# Patient Record
Sex: MALE | Race: ASIAN | NOT HISPANIC OR LATINO
[De-identification: names, ages, dates, MRNs, and addresses within clinical notes are randomized per-mention and may not be internally consistent; named-entity substitution may affect disease eponyms.]

---

## 2022-11-17 PROBLEM — Z00.00 ENCOUNTER FOR PREVENTIVE HEALTH EXAMINATION: Status: ACTIVE | Noted: 2022-11-17

## 2022-11-22 ENCOUNTER — APPOINTMENT (OUTPATIENT)
Dept: SURGICAL ONCOLOGY | Facility: CLINIC | Age: 77
End: 2022-11-22

## 2022-11-30 PROBLEM — E78.5 HYPERLIPIDEMIA, UNSPECIFIED: Chronic | Status: ACTIVE | Noted: 2022-08-04

## 2022-11-30 PROBLEM — I10 ESSENTIAL (PRIMARY) HYPERTENSION: Chronic | Status: ACTIVE | Noted: 2022-08-04

## 2022-12-05 ENCOUNTER — APPOINTMENT (OUTPATIENT)
Dept: SURGICAL ONCOLOGY | Facility: CLINIC | Age: 77
End: 2022-12-05

## 2022-12-05 VITALS
BODY MASS INDEX: 23.5 KG/M2 | TEMPERATURE: 98.4 F | SYSTOLIC BLOOD PRESSURE: 158 MMHG | DIASTOLIC BLOOD PRESSURE: 73 MMHG | HEIGHT: 66.54 IN | OXYGEN SATURATION: 97 % | HEART RATE: 46 BPM | WEIGHT: 148 LBS

## 2022-12-05 DIAGNOSIS — Z86.39 PERSONAL HISTORY OF OTHER ENDOCRINE, NUTRITIONAL AND METABOLIC DISEASE: ICD-10-CM

## 2022-12-05 DIAGNOSIS — K82.8 OTHER SPECIFIED DISEASES OF GALLBLADDER: ICD-10-CM

## 2022-12-05 DIAGNOSIS — Z01.812 ENCOUNTER FOR PREPROCEDURAL LABORATORY EXAMINATION: ICD-10-CM

## 2022-12-05 DIAGNOSIS — Z78.9 OTHER SPECIFIED HEALTH STATUS: ICD-10-CM

## 2022-12-05 DIAGNOSIS — K86.89 OTHER SPECIFIED DISEASES OF PANCREAS: ICD-10-CM

## 2022-12-05 DIAGNOSIS — Z86.79 PERSONAL HISTORY OF OTHER DISEASES OF THE CIRCULATORY SYSTEM: ICD-10-CM

## 2022-12-05 PROCEDURE — 99203 OFFICE O/P NEW LOW 30 MIN: CPT

## 2022-12-05 RX ORDER — ESOMEPRAZOLE MAGNESIUM 40 MG/1
40 CAPSULE, DELAYED RELEASE ORAL
Refills: 0 | Status: ACTIVE | COMMUNITY

## 2022-12-05 RX ORDER — AMLODIPINE BESYLATE 5 MG/1
5 TABLET ORAL
Refills: 0 | Status: ACTIVE | COMMUNITY

## 2022-12-05 RX ORDER — TAMSULOSIN HYDROCHLORIDE 0.4 MG/1
0.4 CAPSULE ORAL
Refills: 0 | Status: ACTIVE | COMMUNITY

## 2022-12-05 NOTE — HISTORY OF PRESENT ILLNESS
[de-identified] : Patient Name: EUGENE SHEA \par MRN: [id number] \par [place] MRN:\par Referring Provider: AMARILYS SMALL MD \par Date: 12/5/22\par \par Diagnosis: gallbladder lesion\par \par 77 year male  presents for evaluation of a 2.3 cm lesion in the gallbladder duct, dilated pancreas duct and stenosis\par In October 2022, he sought medical advice for complaints of epigastric pain and weight loss of 10lbs over 6 months. An ultrasound was done and reportedly showed a lesion in the gallbladder wall. He also complains of pruritus, dark yellow urine (which as resolved).\par 11/5/22 CA 19-9; 43.4, TBili 1.5, DBili 0.9, AST/ALT 64/129\par 11/7/22 MRI shows a 2.3 cm lesion in the fundus of the gallbladder, dilated pancreatic duct, and stenosis\par He has not yet had any further workup.\par \par Currently, Mr. SHEA denies abdominal pain and discomfort, denies having decreased appetite, and denies nausea or vomiting. He denies changes in bowel habits, and his urine has normalized in color. He denies fevers, chills, or night sweats.\par \par Functional Status: Mr. SHEA is able to walk up 2 flights of stairs without fatigue or dyspnea.\par

## 2022-12-05 NOTE — ASSESSMENT
What Type Of Note Output Would You Prefer (Optional)?: Bullet Format [FreeTextEntry1] : I) (1) GB mass (2) abnormal pancreas duct\par \par P) Discussed w patient via  the abnormal findings seen on MRI. Needs an EUS to better assess PD and CBD. Not clear to me what to make of the history the describes of itching and dark urine. No evidence for bile duct obstruction on imaging. Will see him again after EUS. WIll at least need his GB removed but may need pancreas surgery as well. All questions answered.\par \par Vernon Bailey MD\par \par Chief Surgical Oncology\par Multidisciplinary GI cancer program\par [facility] [place]\par [facility]\par \par Professor Surgery\par [employer]\par \par cc Dr Ashley Feldman, Dr Sanchez [facility] Title: Evaluation of Skin Lesions How Severe Are Your Spot(S)?: mild Have Your Spot(S) Been Treated In The Past?: has not been treated

## 2022-12-05 NOTE — RESULTS/DATA
[FreeTextEntry1] : Date: 11/7/22\par Study: MRI/MRCP Abdomen [facility] ([facility])\par Results: The post gadolinium portion of this study is severely limited by patient motion and is nondiagnostic.\par The study confirms a 2.3 cm lesion in the fundus of the gallbladder\par There is dilatation of the pancreatic duct in the pancreatic head to 0.45cm. There is irregularity and beading with an area of severe stenosis in the mid and distal portions of the pancreatic duct. Although no masses demonstrated, malignancy cannot be excluded\par There is no dilatation of the common bile duct\par There are bilateral renal subcentimeter simple cysts\par There are subcentimeter hepatic simple cysts\par Given the patient motion, further evaluation with a pre and postcontrast CT scan of the abdomen is suggested\par ERCP is also suggested\par \par Date: 9/30/22\par Study: EGD (Dr. Gustavo Owens)\par Results: Normal esophagus, congested erythematous atrophic discolored and granular mucosa in the gastric antrum, gastric body and incisura (biopsied), the examination was otherwise normal

## 2022-12-05 NOTE — PHYSICAL EXAM
[Normal Neck Lymph Nodes] : normal neck lymph nodes  [Normal Supraclavicular Lymph Nodes] : normal supraclavicular lymph nodes [Normal] : oriented to person, place and time, with appropriate affect [de-identified] : anicteric [de-identified] : S1,S2, regular rate and rhythm. No murmurs heard. [de-identified] : Clear throughout. No wheezes heard. [de-identified] : warm and dry

## 2022-12-23 ENCOUNTER — APPOINTMENT (OUTPATIENT)
Dept: GASTROENTEROLOGY | Facility: HOSPITAL | Age: 77
End: 2022-12-23

## 2023-03-01 VITALS
WEIGHT: 149.91 LBS | TEMPERATURE: 98 F | OXYGEN SATURATION: 94 % | SYSTOLIC BLOOD PRESSURE: 134 MMHG | RESPIRATION RATE: 16 BRPM | HEIGHT: 66.93 IN | HEART RATE: 45 BPM | DIASTOLIC BLOOD PRESSURE: 71 MMHG

## 2023-03-01 RX ORDER — CHLORHEXIDINE GLUCONATE 213 G/1000ML
1 SOLUTION TOPICAL ONCE
Refills: 0 | Status: DISCONTINUED | OUTPATIENT
Start: 2023-03-06 | End: 2023-03-20

## 2023-03-01 NOTE — H&P ADULT - NSHPLABSRESULTS_GEN_ALL_CORE
16.1   5.24  )-----------( 186      ( 06 Mar 2023 08:03 )             48.6       03-06    143  |  103  |  16  ----------------------------<  94  3.9   |  28  |  0.87    Ca    9.4      06 Mar 2023 08:03    TPro  7.9  /  Alb  4.8  /  TBili  0.6  /  DBili  x   /  AST  21  /  ALT  15  /  AlkPhos  74  03-06      PT/INR - ( 06 Mar 2023 08:03 )   PT: 12.3 sec;   INR: 1.03          PTT - ( 06 Mar 2023 08:03 )  PTT:33.6 sec    CARDIAC MARKERS ( 06 Mar 2023 08:03 )  x     / x     / 95 U/L / x     / 1.9 ng/mL            EKG: SB at 45 bpm. 1 mm ST elevation V1. 1 mm J point elevation V2. T all/Peaked T waves V2-V3.

## 2023-03-01 NOTE — H&P ADULT - ASSESSMENT
78 yo Mandarin speaking M with PMHx of HTN, HLD, BPH, GERD who presents for cardiac cath due to patient's risk factors, CCS Angina Class III Symptoms and abnormal NST concerning for multivessel CAD.  ASA III          Mallampati III  Patient is a candidate for sedation: yes  Sedation: Moderate    Risks & benefits of procedure and alternative therapy have been explained to the patient including but not limited to: allergic reaction, bleeding w/possible need for blood transfusion, infection, renal and vascular compromise, limb damage, arrhythmia, stroke, vessel dissection/perforation, Myocardial infarction, emergent CABG. Informed consent obtained and in chart.       Hgb/HCT normal (16.1/48.6) and Platelets normal (186,000). Patient denies bleeding, BRBPR, melena, hematuria, hematemesis. Patient currently takes Aspirin 81 mg daily and reports compliance last dose 3/5/23    EF normal by echo. Patient euvolemic on exam and has no history of CHF.  cc Bolus IV x 1 followed by NS 75 cc/hr x 2 hrs per Hydration Protocol.  76 yo Mandarin speaking M with PMHx of HTN, HLD, BPH, GERD who presents for cardiac cath due to patient's risk factors, CCS Angina Class III Symptoms and abnormal NST concerning for multivessel CAD.  ASA III          Mallampati III  Patient is a candidate for sedation: yes  Sedation: Moderate    Risks & benefits of procedure and alternative therapy have been explained to the patient including but not limited to: allergic reaction, bleeding w/possible need for blood transfusion, infection, renal and vascular compromise, limb damage, arrhythmia, stroke, vessel dissection/perforation, Myocardial infarction, emergent CABG. Informed consent obtained and in chart.       Hgb/HCT normal (16.1/48.6) and Platelets normal (186,000). Patient denies bleeding, BRBPR, melena, hematuria, hematemesis. Patient currently takes Aspirin 81 mg daily and reports compliance last dose 3/5/23. Stress test concerning for   3VCAD. Per Dr. Suarez will load Plavix on the table if needed.     EF normal by echo. Patient euvolemic on exam and has no history of CHF.  cc Bolus IV x 1 followed by NS 75 cc/hr x 2 hrs per Hydration Protocol.

## 2023-03-01 NOTE — H&P ADULT - HISTORY OF PRESENT ILLNESS
COVID:   Pharmacy:  Cardiologist:   Escort:    76 yo M with PMHx of HTN, HLD, BPH who presented to cardiologist Dr. Suarez's office with reports of  INCOMPLETE    COVID:   Pharmacy:  Cardiologist:   Escort:    76 yo M with PMHx of HTN, HLD, BPH who presented to cardiologist Dr. Suarez's office with reports of intermittent moderate substernal chest pain/pressure and CARVALHO with minimal exertion resolved with rest for the past several months. Pt denies fever, chills, cough, palpitations, LE edema, abdominal pain, N/V/D, dizziness, syncope. Pt underwent ECHO 7/19/22: EF 55-60%, G1DD, LV normal size and systolic function, mild AI, mild to mod MR, mild TR.   In light  COVID:   Pharmacy: [facility]   Cardiologist:   Escort:    Verify meds    78 yo M with PMHx of HTN, HLD, BPH who presented to cardiologist Dr. Suarez's office with reports of intermittent moderate substernal chest pain/pressure and CARVALHO with minimal exertion resolved with rest for the past several months. Pt denies fever, chills, cough, palpitations, LE edema, abdominal pain, N/V/D, dizziness, syncope. Pt underwent ECHO 7/19/22: EF 55-60%, G1DD, LV normal size and systolic function, mild AI, mild to mod MR, mild TR. NST _______  In light of pt's risk factors, CCS class III anginal symptoms and abnormal ___ pt is referred to [facility] for cardiac catheterization with possible intervention if clinically indicated.  COVID: 3/3/23 negative in chart  Pharmacy: [facility]   Cardiologist: Dr. Suarez   Escort: Wife    78 yo Mandarin speaking M with PMHx of HTN, HLD, BPH, GERD  who presented to cardiologist Dr. Suarez's office with reports of intermittent moderate substernal chest pain/pressure and CARVALHO with minimal exertion resolved with rest for the past several months. Pt denies fever, chills, cough, palpitations, LE edema, abdominal pain, N/V/D, dizziness, syncope. Pt underwent ECHO 7/19/22: EF 55-60%, G1DD, LV normal size and systolic function, mild AI, mild to mod MR, mild TR. NST per MD note revealed anterior and anteroseptal ischemia at apex, inferior and inferolateral ischemia from mid ventricle to base.   In light of pt's risk factors, CCS class III anginal symptoms and abnormal NST concerning for multivessel disease patient is referred to [facility] for cardiac catheterization with possible intervention if clinically indicated.

## 2023-03-06 ENCOUNTER — OUTPATIENT (OUTPATIENT)
Dept: OUTPATIENT SERVICES | Facility: HOSPITAL | Age: 78
LOS: 1 days | Discharge: HOME | End: 2023-03-06
Payer: MEDICARE

## 2023-03-06 LAB
A1C WITH ESTIMATED AVERAGE GLUCOSE RESULT: 5.2 % — SIGNIFICANT CHANGE UP (ref 4–5.6)
ALBUMIN SERPL ELPH-MCNC: 4.8 G/DL — SIGNIFICANT CHANGE UP (ref 3.3–5)
ALP SERPL-CCNC: 74 U/L — SIGNIFICANT CHANGE UP (ref 40–120)
ALT FLD-CCNC: 15 U/L — SIGNIFICANT CHANGE UP (ref 10–45)
ANION GAP SERPL CALC-SCNC: 12 MMOL/L — SIGNIFICANT CHANGE UP (ref 5–17)
APTT BLD: 33.6 SEC — SIGNIFICANT CHANGE UP (ref 27.5–35.5)
AST SERPL-CCNC: 21 U/L — SIGNIFICANT CHANGE UP (ref 10–40)
BASOPHILS # BLD AUTO: 0.04 K/UL — SIGNIFICANT CHANGE UP (ref 0–0.2)
BASOPHILS NFR BLD AUTO: 0.8 % — SIGNIFICANT CHANGE UP (ref 0–2)
BILIRUB SERPL-MCNC: 0.6 MG/DL — SIGNIFICANT CHANGE UP (ref 0.2–1.2)
BUN SERPL-MCNC: 16 MG/DL — SIGNIFICANT CHANGE UP (ref 7–23)
CALCIUM SERPL-MCNC: 9.4 MG/DL — SIGNIFICANT CHANGE UP (ref 8.4–10.5)
CHLORIDE SERPL-SCNC: 103 MMOL/L — SIGNIFICANT CHANGE UP (ref 96–108)
CHOLEST SERPL-MCNC: 179 MG/DL — SIGNIFICANT CHANGE UP
CK MB CFR SERPL CALC: 1.9 NG/ML — SIGNIFICANT CHANGE UP (ref 0–6.7)
CK SERPL-CCNC: 95 U/L — SIGNIFICANT CHANGE UP (ref 30–200)
CO2 SERPL-SCNC: 28 MMOL/L — SIGNIFICANT CHANGE UP (ref 22–31)
CREAT SERPL-MCNC: 0.87 MG/DL — SIGNIFICANT CHANGE UP (ref 0.5–1.3)
EGFR: 89 ML/MIN/1.73M2 — SIGNIFICANT CHANGE UP
EOSINOPHIL # BLD AUTO: 0.11 K/UL — SIGNIFICANT CHANGE UP (ref 0–0.5)
EOSINOPHIL NFR BLD AUTO: 2.1 % — SIGNIFICANT CHANGE UP (ref 0–6)
ESTIMATED AVERAGE GLUCOSE: 103 MG/DL — SIGNIFICANT CHANGE UP (ref 68–114)
GLUCOSE SERPL-MCNC: 94 MG/DL — SIGNIFICANT CHANGE UP (ref 70–99)
HCT VFR BLD CALC: 48.6 % — SIGNIFICANT CHANGE UP (ref 39–50)
HDLC SERPL-MCNC: 47 MG/DL — SIGNIFICANT CHANGE UP
HGB BLD-MCNC: 16.1 G/DL — SIGNIFICANT CHANGE UP (ref 13–17)
IMM GRANULOCYTES NFR BLD AUTO: 0.4 % — SIGNIFICANT CHANGE UP (ref 0–0.9)
INR BLD: 1.03 — SIGNIFICANT CHANGE UP (ref 0.88–1.16)
LIPID PNL WITH DIRECT LDL SERPL: 117 MG/DL — HIGH
LYMPHOCYTES # BLD AUTO: 1.7 K/UL — SIGNIFICANT CHANGE UP (ref 1–3.3)
LYMPHOCYTES # BLD AUTO: 32.4 % — SIGNIFICANT CHANGE UP (ref 13–44)
MCHC RBC-ENTMCNC: 29.2 PG — SIGNIFICANT CHANGE UP (ref 27–34)
MCHC RBC-ENTMCNC: 33.1 GM/DL — SIGNIFICANT CHANGE UP (ref 32–36)
MCV RBC AUTO: 88 FL — SIGNIFICANT CHANGE UP (ref 80–100)
MONOCYTES # BLD AUTO: 0.4 K/UL — SIGNIFICANT CHANGE UP (ref 0–0.9)
MONOCYTES NFR BLD AUTO: 7.6 % — SIGNIFICANT CHANGE UP (ref 2–14)
NEUTROPHILS # BLD AUTO: 2.97 K/UL — SIGNIFICANT CHANGE UP (ref 1.8–7.4)
NEUTROPHILS NFR BLD AUTO: 56.7 % — SIGNIFICANT CHANGE UP (ref 43–77)
NON HDL CHOLESTEROL: 132 MG/DL — HIGH
NRBC # BLD: 0 /100 WBCS — SIGNIFICANT CHANGE UP (ref 0–0)
PLATELET # BLD AUTO: 186 K/UL — SIGNIFICANT CHANGE UP (ref 150–400)
POTASSIUM SERPL-MCNC: 3.9 MMOL/L — SIGNIFICANT CHANGE UP (ref 3.5–5.3)
POTASSIUM SERPL-SCNC: 3.9 MMOL/L — SIGNIFICANT CHANGE UP (ref 3.5–5.3)
PROT SERPL-MCNC: 7.9 G/DL — SIGNIFICANT CHANGE UP (ref 6–8.3)
PROTHROM AB SERPL-ACNC: 12.3 SEC — SIGNIFICANT CHANGE UP (ref 10.5–13.4)
RBC # BLD: 5.52 M/UL — SIGNIFICANT CHANGE UP (ref 4.2–5.8)
RBC # FLD: 13.7 % — SIGNIFICANT CHANGE UP (ref 10.3–14.5)
SODIUM SERPL-SCNC: 143 MMOL/L — SIGNIFICANT CHANGE UP (ref 135–145)
TRIGL SERPL-MCNC: 76 MG/DL — SIGNIFICANT CHANGE UP
WBC # BLD: 5.24 K/UL — SIGNIFICANT CHANGE UP (ref 3.8–10.5)
WBC # FLD AUTO: 5.24 K/UL — SIGNIFICANT CHANGE UP (ref 3.8–10.5)

## 2023-03-06 PROCEDURE — 80061 LIPID PANEL: CPT

## 2023-03-06 PROCEDURE — 85730 THROMBOPLASTIN TIME PARTIAL: CPT

## 2023-03-06 PROCEDURE — 83036 HEMOGLOBIN GLYCOSYLATED A1C: CPT

## 2023-03-06 PROCEDURE — C1769: CPT

## 2023-03-06 PROCEDURE — C1887: CPT

## 2023-03-06 PROCEDURE — 99152 MOD SED SAME PHYS/QHP 5/>YRS: CPT

## 2023-03-06 PROCEDURE — 85025 COMPLETE CBC W/AUTO DIFF WBC: CPT

## 2023-03-06 PROCEDURE — 80053 COMPREHEN METABOLIC PANEL: CPT

## 2023-03-06 PROCEDURE — 82550 ASSAY OF CK (CPK): CPT

## 2023-03-06 PROCEDURE — 93010 ELECTROCARDIOGRAM REPORT: CPT

## 2023-03-06 PROCEDURE — 93454 CORONARY ARTERY ANGIO S&I: CPT

## 2023-03-06 PROCEDURE — C1894: CPT

## 2023-03-06 PROCEDURE — 82553 CREATINE MB FRACTION: CPT

## 2023-03-06 PROCEDURE — 93005 ELECTROCARDIOGRAM TRACING: CPT

## 2023-03-06 PROCEDURE — 85610 PROTHROMBIN TIME: CPT

## 2023-03-06 RX ORDER — OLMESARTAN MEDOXOMIL 5 MG/1
1 TABLET, FILM COATED ORAL
Qty: 0 | Refills: 0 | DISCHARGE

## 2023-03-06 RX ORDER — ACETAMINOPHEN 500 MG
650 TABLET ORAL ONCE
Refills: 0 | Status: COMPLETED | OUTPATIENT
Start: 2023-03-06 | End: 2023-03-06

## 2023-03-06 RX ORDER — POTASSIUM CHLORIDE 20 MEQ
20 PACKET (EA) ORAL ONCE
Refills: 0 | Status: COMPLETED | OUTPATIENT
Start: 2023-03-06 | End: 2023-03-06

## 2023-03-06 RX ORDER — DEXLANSOPRAZOLE 30 MG/1
1 CAPSULE, DELAYED RELEASE ORAL
Qty: 0 | Refills: 0 | DISCHARGE

## 2023-03-06 RX ORDER — SODIUM CHLORIDE 9 MG/ML
500 INJECTION INTRAMUSCULAR; INTRAVENOUS; SUBCUTANEOUS
Refills: 0 | Status: DISCONTINUED | OUTPATIENT
Start: 2023-03-06 | End: 2023-03-20

## 2023-03-06 RX ORDER — SODIUM CHLORIDE 9 MG/ML
500 INJECTION INTRAMUSCULAR; INTRAVENOUS; SUBCUTANEOUS
Refills: 0 | Status: DISCONTINUED | OUTPATIENT
Start: 2023-03-06 | End: 2023-03-06

## 2023-03-06 RX ORDER — SODIUM CHLORIDE 9 MG/ML
250 INJECTION INTRAMUSCULAR; INTRAVENOUS; SUBCUTANEOUS ONCE
Refills: 0 | Status: COMPLETED | OUTPATIENT
Start: 2023-03-06 | End: 2023-03-06

## 2023-03-06 RX ORDER — ASPIRIN/CALCIUM CARB/MAGNESIUM 324 MG
81 TABLET ORAL ONCE
Refills: 0 | Status: COMPLETED | OUTPATIENT
Start: 2023-03-06 | End: 2023-03-06

## 2023-03-06 RX ADMIN — Medication 20 MILLIEQUIVALENT(S): at 09:31

## 2023-03-06 RX ADMIN — SODIUM CHLORIDE 500 MILLILITER(S): 9 INJECTION INTRAMUSCULAR; INTRAVENOUS; SUBCUTANEOUS at 09:31

## 2023-03-06 RX ADMIN — Medication 650 MILLIGRAM(S): at 16:30

## 2023-03-06 RX ADMIN — SODIUM CHLORIDE 75 MILLILITER(S): 9 INJECTION INTRAMUSCULAR; INTRAVENOUS; SUBCUTANEOUS at 09:31

## 2023-03-06 RX ADMIN — SODIUM CHLORIDE 100 MILLILITER(S): 9 INJECTION INTRAMUSCULAR; INTRAVENOUS; SUBCUTANEOUS at 12:01

## 2023-03-06 RX ADMIN — Medication 81 MILLIGRAM(S): at 09:31

## 2023-03-06 RX ADMIN — Medication 650 MILLIGRAM(S): at 15:40

## 2023-03-06 NOTE — PROGRESS NOTE ADULT - SUBJECTIVE AND OBJECTIVE BOX
Interventional Cardiology PA SDA Discharge Note    s/p cardiac cath 3/06/23 via [facility]: non obstructive coronaries, mLAD myocardial bridge, OM1 30%, no EDP     Patient without complaints. Ambulated and voided without difficulties    Afebrile, VSS    Ext:  	Right  Radial :  no  hematoma, no    bleeding, dressing; C/D/I      Pulses:    intact RAD 2+     A/P:  78 yo Mandarin speaking M with PMHx of HTN, HLD, BPH, GERD who presents for cardiac cath due to patient's risk factors, CCS Angina Class III Symptoms and abnormal NST concerning for multivessel CAD.    s/p cardiac cath 3/06/23  via RRA: non obstructive coronaries, mLAD myocardial bridge, OM1 30%      1.	Stable for discharge as per attending Dr. Suarez  after bed rest, pt voids, wrist stable and 30 minutes of ambulation.  2.	Follow-up with PMD/Cardiologist Dr Suarez in 1-2 weeks  3.	Discharged forms signed and copies in chart

## 2023-03-15 DIAGNOSIS — I25.118 ATHEROSCLEROTIC HEART DISEASE OF NATIVE CORONARY ARTERY WITH OTHER FORMS OF ANGINA PECTORIS: ICD-10-CM

## 2023-03-15 DIAGNOSIS — R94.39 ABNORMAL RESULT OF OTHER CARDIOVASCULAR FUNCTION STUDY: ICD-10-CM
